# Patient Record
Sex: MALE | Race: WHITE
[De-identification: names, ages, dates, MRNs, and addresses within clinical notes are randomized per-mention and may not be internally consistent; named-entity substitution may affect disease eponyms.]

---

## 2017-02-13 NOTE — EMERGENCY ROOM VISIT NOTE
History


First contact with patient:  17:53


Chief Complaint:  CALF PAIN


Stated Complaint:  POPPING OF R CALF FOLLOWED BY IMMENSE PAIN





History of Present Illness


The patient is a 41 year old male who presents to the Emergency Room with 

complaints of right calf pain.  The patient states that he was running 

approximately one and half hours ago when he felt a sudden popping sensation 

and pain in his right calf.  He states it felt like someone shot him in the 

calf.  He has had difficulty walking and intense pain in the calf since then.  

He rates the discomfort at 8/10.  He denies any previous injuries.  The patient 

does report a history of right knee problems and has seen orthopedics regarding 

this.  He did receive an injection in the right knee 2 months ago.  The patient 

is concerned because he states he took 2 doses of ciprofloxacin for urinary 

symptoms 1 week ago.  He states that he read on the Internet that this may 

cause tendon rupture.  The patient denies any numbness or weakness.  He denies 

any other injuries.





Review of Systems


A complete 6 Review of Systems was discussed with the patient, with pertinent 

positives and negatives listed in the History of Present Illness. All remaining 

Review of Systems questions can be considered negative unless otherwise 

specified.





Social History


Smoking Status:  Never Smoker





Current/Historical Medications


Scheduled


Valacyclovir HCl (Valacyclovir HCl), 500 MG PO DAILY





Scheduled PRN


Oxycodone/Acetaminophen 5MG/325MG (Percocet 5MG/325MG), 1-2 TABS PO Q4H PRN for 

Pain





Allergies


Uncoded Allergies:  


     PENICILLIN (Allergy, Severe, ANAPHYLAXIS, 2/13/17)





Physical Exam


Vital Signs











  Date Time  Temp Pulse Resp B/P Pulse Ox O2 Delivery O2 Flow Rate FiO2


 


2/13/17 18:21  77 18 137/90 95 Room Air  


 


2/13/17 17:50 37.0 81 18 147/87 97 Room Air  











Physical Exam


VITALS: Vitals are noted on the nurse's note and reviewed by myself.  Vital 

signs stable.


GENERAL: This is a 41-year-old male, in no acute distress, nondiaphoretic, well-

developed well-nourished.


SKIN: No erythema, warmth or ecchymosis.


MUSCULOSKELETAL: There is no obvious deformity.  There is some edema and 

significant tenderness of the medial aspect of the proximal right gastrocnemius 

muscle.  Patient has full plantar flexion but decreased dorsiflexion.


NEURO: Patient was alert and oriented to person place and time.  Normal 

sensation to light and sharp touch.  Deep tendon reflexes 2+.





Medical Decision & Procedures


Medications Administered











 Medications


  (Trade)  Dose


 Ordered  Sig/Zach


 Route  Start Time


 Stop Time Status Last Admin


Dose Admin


 


 Oxycodone/


 Acetaminophen


  (Percocet


 5-325mg Tab)  1 tab  NOW  STAT


 PO  2/13/17 18:10


 2/13/17 18:11 DC 2/13/17 18:21


1 TAB











Medical Decision


Differential diagnosis includes tendon rupture, gastrocnemius tear, gastrectomy 

strain, among others.





The patient was evaluated as above.  Clinically, he has an injury to the medial 

aspect of the proximal right gastrocnemius muscle.  I do not feel any imaging 

is necessary at this time.  The patient is established with [facility] and was instructed to follow-up with them this week for further 

evaluation.  He was placed in an Ace wrap and given crutches.  He was given 1 

Percocet tablet here as well as a short prescription.  He was instructed to 

take anti-inflammatories and ice the area frequently.  He verbalized 

understanding of my assessment and treatment plan and was discharged home in 

good condition.





[place] Drug Monitoring Program


Search Results:  patient reviewed within database





Impression





 Primary Impression:  


 Injury of calf





Departure Information


Dispostion


Home / Self-Care





Condition


GOOD





Prescriptions





Oxycodone/Acetaminophen 5MG/325MG (PERCOCET 5MG/325MG)  Tab


1-2 TABS PO Q4H Y for Pain, #15 TAB


   For Initial Treatment


   Prov: Betsey Brady .ARMIN         2/13/17





Referrals


Zander Uribe M.D. (PCP)








George Starr M.D.





Patient Instructions


[facility]





Additional Instructions





You have been treated in the Emergency Department for a Calf Injury.  You have 

received pain medicine in the emergency department which impairs your ability 

to operate a vehicle. It is illegal for you to drive after receiving these 

medicines. 





You have been prescribed Percocet to be used for pain control. This is a 

narcotic medication. You cannot drive or consume alcohol while on this 

medicine. This medicine should only be used for pain that cannot be controlled 

with over-the-counter pain medicines.





For pain control, you can use the following over-the-counter medicines (if >11 yo):





- Regular strength (325mg/tab) Tylenol (acetaminophen) 2 tabs every 4-6 hours 

as needed. Do not exceed 12 tablets in a 24 hour period. Avoid taking more than 

4 grams (4000 mg) of Tylenol per day. This includes any other sources of 

acetaminophen you may take on a regular basis.





- Regular strength (200 mg/tab) Advil (ibuprofen) 1-2 tabs every 4-6 hours as 

needed. Do not exceed a dose of 3200 mg per day.





If this is a recent injury (<24 hrs), ice can be applied to the area of pain 

for the first 3 days to help decrease pain and inflammation.





You have been provided the number for an Orthopaedic Surgeon. You should call 

this number as soon as possible to establish a follow-up visit from today's 

Emergency Department visit.





Use the crutches to keep weight off of the affected leg until follow-up with 

orthopedics.





Return to the Emergency Department if your current symptoms worsen despite 

treatment course outlined above, or if you develop any of the following symptoms

: intractable pain despite aforementioned treatment course or new onset of 

numbness or tingling of the foot.

## 2017-02-14 NOTE — DIAGNOSTIC IMAGING REPORT
RIGHT TIBIA AND FIBULA 2 VIEWS



CLINICAL HISTORY: Calf pain. Recent injury.



FINDINGS: AP and lateral views of the right tibia and fibula are obtained. No

prior studies are available for comparison at the time of dictation. The

skeletal structures are well mineralized. No fracture is seen in the right tibia

or fibula. The knee and ankle joints are grossly maintained. Minimal soft tissue

edema is suggested in the calf.



IMPRESSION: Minimal soft tissue edema is suggested in the calf. No acute bony

abnormality is identified in the right tibia or fibula.







Electronically signed by:  Bill Rubio M.D.

2/14/2017 1:07 PM



Dictated Date/Time:  2/14/2017 1:06 PM

## 2017-10-03 NOTE — PAP/PSG TECHNICIAN REPORT
[facility]

Technician Polysomnogram Report



Study name:

None

Report date:

10/3/2017



Study date:

10/2/2017

Referring Physician:

 Zander Uribe M.D.



Name:

YARON JO 

Interpreting Physician:

Migue Hensley D.O.



YOB: 1975

Technician:

Ning Pacheco, PSGT.



Sex:

Male







Age:

42

StudyType:

PSG



Weight:

235 lbs









Height:

42 years, Height 6' 1"







BMI:

31







Medications:

Temazepam 30 mg.















Patient History





42 yr. old male presents to the sleep lab for a diagnostic sleep study. Pt. states that he has 
memory dysfunction, and impaired daytime functioning. States that he falls asleep easily, but wakes 
often until 3 am when he gets up for the day. Patient states that he does not nap during the day.Ess 
= 12. 







Parameters Monitored

NPSG: E1-M2, E2-M1, Fp1-M2, Fp2-M1, F3-M2, F4-M2, F4-M1, C3-M2, C4-M2, C4-M1, O1-M2, O2-M2,

O2-M1, T3-M2, T4-M1, P3-M2, P4-M1, CHIN1, CHIN2, HR, EKG, Legs, PFLOW, SNOR, FLOW, CFLOW,

Tidal Volume, THOR, ABDO, SpO2, PLTH, CPRESS, ETCO2 Wave, ETCO2, pH





Sleep Architecture



Sleep Stages



Time at Lights Off

9:38:45 PM



STAGES

Time (min.)

TST (%)



Time at Lights On

3:56:45 AM



Wake

97.0

--



Total Recording Time (TRT)

377.00 min.



N1

32.5

12



Total Sleep Period (TSP)

372.5 min.



N2

170.5

61



Total Sleep Time (TST)

280.0min.



N3

16.0

6



Awake Time

97.0 min.



REM

61.0

22



Wake after Sleep Onset

95.5 min.











Sleep Efficiency (SE)

74 %











Sleep Onset Latency (SOL)

2.5 min.











Number of Stage 1 Shifts

None











Awakenings

9











Stage Changes

36











Number of REM periods

3



REM

61.0

22



REM Latency

75.0 min.



NREM

219.0

78





Body Position Analysis





Supine

Right

Left

Side

Prone

Vertical



Total Sleep Time (min.)

212.8

87.9

9.0

96.88

0.0

0.0



Total Sleep Time (%)

65%

31%

3%

35

0%

N/A%



Total Sleep Time REM (min.)

35.8

25.2

0.0

None

0.0

0.0



Total Sleep Time NREM (min.)

147.3

62.7

9.0

None

0.0

0.0



Intermittent Wake (min.)

29.6

0.0

67.4

None

0.0

0.0



Total Sleep Period (%)

56%

None





Arousals







Myoclonus (PLM) *







Events

Count

Index



Events

Count

Index



Spontaneous

32

7



Events Awake (PLMW)

4

2.5



Respiratory

4

0.9



Events Asleep w/ Arousal (PLMA)

5

1.1



PLM

5

1



Events Asleep w/o Arousal (PLMS)

69

14.8



Snoring

3

1



Total Asleep

74

15.9



Total

44

9



Total

78

12







Respiratory Analysis *





CA

OA

MA

CH

H

RERA

Total



Count

0

1

0

0

31

0

32



Index

0.0

0.2

0.0

0

6.6

0

6.9



Mean Duration

0.0

12.3

0.0

0.00

28.6

0.0

28.1



Longest Duration

0.0

12.3

0.0

0.00

0.0

0.0

57.9







Respiratory Event Summary 







Total

Supine

~Supine

Right

Left

Prone

REM

NREM



Apneas

Count

1

1

0

N/A

0

1





Index

0.2

0

0

0.0

0.0

N/A

0

0



Hypopneas (4% Desat)

Count

31

31

0

N/A

15

16





Index

6.6

10.2

0

0.0

0.0

N/A

14.8

4.4



Apneas & All Hypopneas 

Count

32

32

0

N/A

15

17





Index

6.9

10

0

N/A

14.8

4.7



Respiratory Events 

(Apn+All Hyp+RERA)

Count

32

32

0

N/A

15

17





Index

6.9

10

0

0.0

0.0

N/A

14.8

4.7



Respiratory Related Arousal

Count

4

32

0

N/A

2

2





Index

0.9

1

0

N/A

2

1





Snoring Analysis





Supine

Right

Left

Prone

REM

NREM

Total



Snore duration

15.0 min



Snores count

592

39

2

N/A

119

514

633



Snore mean duration

1.4 Sec



Snores index

194

27

13

N/A

117.0

140.8

135.6



TST with snoring (%)

5.3%







Desaturation Event Summary:



Minimum %SpO2

Event Count

Mean/Min/Max Duration(sec.)

Desaturation Index

% Time In Bed



> 90

31

30.0 / 9.5 / 60.0

5.5

94.9



86 - 90

0

N/A

0.0

4.5



81 - 85

0

N/A

0.0

0.6



76 - 80

0

N/A

0.0

0.0



71 - 75

0

N/A

0.0

0.0



66 - 70

0

N/A

0.0

0.0



61 - 65

0

N/A

0.0

0.0



56 - 60

0

N/A

0.0

0.0



51 - 55

0

N/A

0.0

0.0



< 50

0

N/A

0.0

0.0









Total

REM

NREM

Awake



<50%

0.0 min.



51 - 60%

0.0 min.



61 - 70%

0.0 min.



71 - 80%

0.0 min.



81 - 90%

18.2 min.

5.2 min.

10.3 min.

2.7 min.



91 - 100%

340.6 min.

55.6 min.

195.9 min.

89.1 min.



Average

93

94



Minimum SpO2

71

85

82

71



Desaturation Event Index

4.9

15.7

4.1

0.0



# Desat. Events below 89%

5

4

1

N/A



Time(%) with Saturation below 89%

1.0

0.3



Time(min.) with Saturation below 89%

3.4

1.2

1.2

1.1









Time (mins)

REM (mins)

NREM (mins)

% of TST



SpO2 Below 90%

16

11

N5

1.8



SpO2 Below 88%

2

0

0

1





Heart Rate Analysis









Min (bpm)

Max (bpm)

Average (bpm)



Awake

52

127

63



NREM

51

87

62



REM

54

92

63



Overall

51

92

62













Supplemental O2 Values



Minimum O2 level: None



Value  

Start Time

End Time





Technician Comments







PSG Study

Mr. Jo slept in the right, left, and supine positions.  No cardiac arrhythmia or PLM's noted.  No 
bruxism noted.  Snoring was noted and scored as a 2 on a scale of 1 through 5. (0=no snoring, 
5=snoring loud enough to be heard through a closed door or down the [address])  awoke to use 
the restroom zero times during the night.  Mr. Jo stated, I did not sleep as well as I do when I 
am in my own bed. 

The final report will be interpreted and signed by a sleep physician. The completed physician report 
will then be placed in the patient medical record.



  





 



 



 



 



 



 



 



 

 



Therapy (cm H2O)

0



TIB (min.)

377.0



TST (min.)

280.0



Sleep Onset (min.)

2.5



REM Onset From Sleep (min.)

75.0



Sleep Efficiency %

74



Wakefulness (%)

26



Wakefulness (min.)

97.0



NREM 1 (%)

12



NREM 1 (min.)

32.5



NREM 2 (%)

61



NREM 2 (min.)

170.5



NREM 3 (%)

6



NREM 3 (min.)

16.0







REM (%)

22



REM (min.)

61.0



# Arousals

44



Arousal Index

9



# Snore

633



Snore Index

135.6



AHI

6.9



AHI Supine

10



AHI Non-Supine

0



NREM AHI

4.7



REM AHI

14.8



RDI

6.9



# Obstructive Apnea

1



# Central Apnea

0



# Mixed Apnea

0







# Hypopneas

31



RERAs

0



Total Respiratory Events

33



Time Below SpO2 89% (min.)

2.3



Mean NREM SpO2 (%)

93



Mean REM SpO2 (%)

93



Mean Sleep SpO2 (%)

93



Min NREM SpO2 (%)

82



Min REM SpO2 (%)

85



Position Supine (min.)

212.8



Position Non-supine (min.)

96.9



LM Index Sleep

15.9



LM Index NREM

18.6



LM Index REM

5.9







Mean Heart Rate (bpm)

62



Min Heart Rate (bpm)

51

## 2017-10-03 NOTE — SLEEP STUDY
Sleep Study Report


Date of Service:


10/2/2017


Sleep Study Report


Clinical data:


The patient is a 42-year-old male.  He has a chief complaint of disturbed 

nocturnal sleep and memory problems.  The patient has difficulty sleeping after 

3 a.m..  He had 2 prior sleep studies done.  The 1st 1 in 2011 in [place] 

reported mild sleep apnea.  The 2nd study done in [place]  did not show sleep 

apnea.  The patient completed the [place] Sleepiness Scale and had a score of 

13. His BMI is elevated at 31. The patient however is very physically fit in 

spite of the BMI.  This was an in-lab diagnostic polysomnography.





Sleep architecture:


The total sleep period was 372.5 minutes.  The total sleep time was 280.0 

minutes.  The sleep efficiency was moderately reduced to 74 percent.  The sleep 

latency was short at 2.5 minutes.  Wake after sleep onset was elevated to 95.5 

minutes.  The sleep latency was 75 minutes.


Sleep consisted of stage N1 12 percent, stage N2 61 percent, stage N3 6 percent

, stage REM 22 percent.





Arousal data:


The patient had a total of 44 arousals including 32 spontaneous arousals, 4 

respiratory arousals, 5 PLM arousals, and 3 snoring arousals.  The arousal 

index was 9.





PLM data:


The patient had a total of 74 periodic limb movements of sleep for a PLM index 

of 15.9.  There were 5 arousals associated with limb movements for a PLM 

arousal index of 1.1.





EKG:


The underlying cardiac rhythm was normal sinus.  The cardiac rates ranged from 

51 to 92 beats per minute.  The average heart rate was 62 beats per minute.  No 

arrhythmia was seen.





Respiratory data:


The patient had a total of 32 respiratory events including 1 obstructive apnea 

and 31 hypopneas.  Hypopneas were scored according to the 4 percent 

desaturation rule.  The longest apnea was 12.3 seconds.  The mean duration of 

the hypopneas was 28.6 seconds.  The apnea-hypopnea index was 6.9 events per 

hour.  This reflects mild sleep apnea.





Oximetry data:


The average saturation was 93 percent.  The minimum saturation recorded was 71 

percent.  This clearly was technical however.  He had only 2 minutes with 

saturations less than 88 percent.





Technician comments:


The patient slept on the right, left, and supine positions.  No cardiac 

arrhythmia noted.  No bruxism noted.  Snoring was noted and scored as a 2 on a 

scale of 1 through 5. The patient indicated he did not sleep as well as he does 

in his own bed.





Impressions:


1. Obstructive sleep apnea-mild 


2.  Periodic limb movement disorder





Comments:


This sleep on the night of the sleep study parallels with what  the patient's 

history was.  He falls asleep readily.  He sleeps for approximately an hour and 

then has an awakening.  Ultimately keeps waking up and then subsequently cannot 

sleep.  He had very little sleep after 2:40 a.m..  He did have mild obstructive 

sleep apnea.  It seems unlikely that this degree of sleep apnea would be 

accounting for his insomnia as well as his memory issues.  However improvement 

with treatment cannot be excluded.  Thus consideration is given to treatment 

with nasal CPAP therapy as a trial basis to see if his symptoms improved.  His 

oxygenation was not significantly abnormal.  He had a modest number of limb 

movements but with few arousals.  It seems unlikely the limb movements are a 

significant contributor to his symptoms.





Recommendations:


1. Consideration is given to a trial of nasal CPAP.  This could be done by a 

CPAP titration in the sleep lab or with treatment with auto CPAP.





2.  Weight loss is advised in light of the elevation of body mass index at 31.





3. Further suggestions may be made following the trial of nasal CPAP.





4. The patient should be advised the appropriate principles of sleep hygiene 

including having a regular sleep-wake schedule and attempting to increase his 

total sleep time.


Copies To 1:   Zander Uribe M.D.; Migue Hensley,

## 2018-02-16 NOTE — DIAGNOSTIC IMAGING REPORT
R LOWER EXT JOINT WITHOUT



CLINICAL HISTORY: 42 years-old Male presenting with RIGHT KNEE PAIN. 



TECHNIQUE: Multisequence, multiplanar MR imaging of the right knee was performed

without the use of intravenous contrast. IV contrast: None. 



COMPARISON: Radiographs from 10/27/2017.



FINDINGS:



Localizer images: Unavailable.



No bony edema. Bone island noted in the lateral femoral condyle.



Full-thickness articular cartilage loss at the medial most aspect of the medial

tibial plateau with subchondral cystic change. Osteophytosis also noted. Minimal

increased signal intensity and irregularity of the lateral facet of the patellar

cartilage. No irregularity of the apposing trochlear cartilage.



Complex tear of the posterior horn body junction of the medial meniscus, which

approaches the inferior articular surface. Significant attenuation of the body

of the medial meniscus, which is slightly extruded laterally. There is suspected

disruption of the meniscofemoral ligamentous attachment of the medial meniscus.

The anterior horn is largely preserved. Degenerative changes of the posterior

horn of the lateral meniscus suspected.



Anterior and posterior cruciate ligaments intact.



Medial collateral ligament intact.



Lateral collateral ligament complex including the biceps femoris tendon, fibular

collateral ligament, popliteus tendon, and iliotibial band intact.



Quadriceps and patellar tendons intact.



Small knee joint effusion. No popliteal cyst.



Normal muscle bulk and muscle signal intensity.



IMPRESSION:

1.  Complex tear of the posterior horn body junction with severe attenuation of

the medial meniscal body and suspected disruption of the medial meniscofemoral

ligament.



2.  Degenerative changes of the posterior horn of the lateral meniscus.



3.  Full thickness articular cartilage loss with subchondral cystic change and

osteophytosis at the medial aspect of the medial tibial plateau.



4.  Minimal degenerative changes of the lateral patellar facet articular

cartilage.



5.  Small knee joint effusion.







Electronically signed by:  George Clark M.D.

2/16/2018 8:41 AM



Dictated Date/Time:  2/16/2018 8:35 AM

## 2018-03-12 NOTE — OPERATIVE REPORT
DATE OF OPERATION:  03/12/2018

 

PREOPERATIVE DIAGNOSES:  Right knee medial meniscus tear and a subchondral

fracture of the medial tibial plateau.

 

POSTOPERATIVE DIAGNOSES:  Same and extensive synovitis.

 

OPERATIONS PERFORMED:

1.  Injection of calcium phosphate cement to treat subchondral proximal

tibial fracture.

2.  Right knee arthroscopy with synovectomy and partial medial meniscectomy.

 

SURGEON:  George Chavez MD

 

ASSISTANT:  DUNCAN Jones.

 

ESTIMATED BLOOD LOSS:  25 mL.

 

IV FLUIDS:  650 mL of crystalloid.

 

SPECIMENS:  None.

 

COMPLICATIONS:  Extrusion of a calcium phosphate cement into the soft tissues

just anterior to the semimembranosus.  This was removed through a separate

posteromedial incision.

 

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

at [facility], who has had medial sided knee pain that has been refractory to

conservative management including injections, activity modification and

others.  He would like to stay active running.  Physical examination shows

tenderness along the proximal medial tibia.  He has also medial joint line

tenderness.  MRI shows a subchondral fracture with subchondral cyst formation

in the proximal medial tibia.  He also has a degenerative medial meniscus

tear.  His arthritis is mild on his x-rays.  I had a long discussion with him

about the risks and benefits of surgery, alternatives to surgery and expected

outcomes.  He understands that due to early arthritis he has in his knee that

this may preclude him from a high level activities even despite our best

efforts with surgery.  After reviewing all these, he elected to proceed.  All

questions were answered.  Informed consent was signed.

 

OPERATIVE FINDINGS:

1.  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

2.  Trochlea showed grade 1 chondromalacia.

3.  Medial and lateral gutters were free of any loose bodies.

4.  The medial compartment showed a degenerative tear of the body of the

medial meniscus extending into the posterior horn.  He was already clearly a

meniscal deficient, with over 80% of the body of the meniscus removed from his 
prior 2 knee scopes.  There was an area of grade 4 chondromalacia involving the 
medial tibial plateau approximately 8 x 10 mm

in size with well-defined margins.  He had some evidence of prior steroid

injections as well with some precipitate of the steroid medication in the

cartilage of the medial femoral condyle and medial meniscus.

5.  The ACL and the PCL were intact.

6.  The anterior compartment showed both medial and lateral synovitis.

7.  The lateral compartment showed some fraying at the posterior root, but

the remainder of the lateral meniscus was intact.  He had grade 3 chondral

wear of the lateral tibial plateau and grade 1 softening of the lateral

femoral condyle diffusely.

 

The calcium phosphate cement was injected into the subchondral fracture. Cement 
that extruded in front of the semimembranosus was removed through a 
posteromedial incision.

A partial medial meniscectomy was performed as well as a synovectomy of the 
medial and lateral

knee.

 

DESCRIPTION OF THE OPERATION:  The patient was identified in the preoperative

holding area, where surgical site was marked.  He was brought back to main

operating room, where he was placed on the operating room table and general

anesthesia was administered.  A bump was placed underneath the

operative hip.  All bony prominences were padded.  Perioperative antibiotics

were administered.  He was prepped and draped in the normal sterile fashion. 

Prior to incision, a multidisciplinary timeout was called.  All in the room

were in agreement.

 

We began by performing the calcium phosphate injection procedure to treat his

subchondral fracture.  C-arm was brought in and the injection site was

localized on the AP view initially.  We then made a small stab incision and

placed the trocar sleeve down on the bone until it was optimized in the AP

view.  We then swung the C-arm to the lateral view.  We obtained a perfect

lateral view of the knee.  We then drilled the needle under fluoroscopic

guidance towards the posterior cortex.  We then removed the inner trocar. 

The side vents were directed medially.  We then injected the calcium

phosphate cement in both the medial direction as well as superiorly. 

Unfortunately, some of the cement extruded out the posterior aspect of the

knee, indicating a small perforation in the posterior cortex.  We therefore

pulled the needle back approximately a cm and injected the remainder of the

cement in this location that would be further from the posterior knee.  Our

fluoroscopic images were then reviewed showing a moderate amount of cement 
anterior to the semimembranosus.  In order to decrease the irritation

of the cement on the semimembranosus, I therefore elected to remove the

cemented that extruded outside of the bone into the soft tissues.  A 4-cm

incision was made centered over the posteromedial aspect of the tibia.  I

dissected through subcutaneous tissues down to the level of the fascia. 

The saphenous nerve was not visible in the wound.  Fascia was incised in line 
with the incision.  The semimembranosus tendon was identified.  We were

able to palpate the cement just on the anterior aspect of the semimembranosus

tendon.  Cement was then removed with a combination of curettes and rongeurs.

 We irrigated out the wound with saline.  Fluoroscopy was brought back in and 
we had removed

the vast majority of the cement.  The small amount that remained will resorb

over time and further dissection was deemed to be not beneficial for the

patient.  Therefore, we irrigated out his wound.  The fascia was closed with

a running 0 Vicryl suture.  The deep dermis was closed with interrupted 2-0

Vicryl and a 3-0 Monocryl was run subcuticularly for the skin.

 

Next, we moved on to his arthroscopic procedure.  An anterolateral working

portal was created followed by a medial working portal under direct

visualization.  Diagnostic arthroscopy was performed, revealing the above

findings.

 

Once a diagnostic arthroscopy was complete, the electrocautery as well as the

shaver were used to remove pathologic synovitis in the medial and lateral

aspects of the knee.  His medial meniscus tear was then extensively probed and 
was not repairable. 

The shaver was then used to trim the medial meniscus back to a stable margin.

 A biter was also used along the posterior horn, where he had some

degenerative fraying.  The knee was then irrigated with saline.  The

arthroscopic instruments were removed.  The patient's portals were closed

with inverted 3-0 Monocryl sutures.  A compressive dressing was applied.  The

patient was awoke from anesthesia and transferred to recovery room in stable

condition.

 

POSTOPERATIVE COURSE:  The patient will be discharged home from the recovery

room.  He will be weightbearing as tolerated with crutches for the next 2-3

days.  He will be on aspirin for DVT prophylaxis.  He will return to our

clinic tomorrow for physical therapy to begin his rehabilitation following

the knee arthroscopy debridement protocol.

 

 

I attest to the content of the Intraoperative Record and any orders documented 
therein. Any exceptions are noted below.

 

 

 

NIGEL

## 2018-03-12 NOTE — [facility] POST OPERATIVE BRIEF NOTE
Immediate Operative Summary


Operative Date


Mar 12, 2018.





Pre-Operative Diagnosis





Right Knee Medial Meniscus Tear, Proximal Tibia Subchondral Fracture





Post-Operative Diagnosis





same as preop





Procedure(s) Performed





Right Knee Arthroscopic Partial Medial Meniscectomy, injection of Calcium 


Phospate Cement To Treat Right Proximal Tibia Fracture





Surgeon


Dr. Kraus





Assistant Surgeon(s)


ARMIN Parisi





Estimated Blood Loss


25ML





Findings


Consistent with Post-Op Diagnosis





Fluids (cc crystalloids)


650 cc





Specimens





none





Drains


None





Anesthesia Type


General





Complication(s)





Extrusion of cement into soft tissues - removed through posteromedial


approach to the knee





Disposition


Accompanied Pt To Recovery:  no


Disposition:  Recovery Room / PACU

## 2018-03-12 NOTE — DISCHARGE INSTRUCTIONS
Discharge Instructions


Date of Service


Mar 12, 2018.





Admission


Reason for Admission:  Right Knee Medial Meniscus Tear, Proximal Tibia Fx





Discharge


Discharge Diagnosis / Problem:  Right knee medial meniscus tear, Prox tibia fx





Discharge Goals


Goal(s):  Decrease discomfort, Improve function, Increase independence





Activity Recommendations


Activity Limitations:  as noted below


Lifting Limitations:  none


Exercise/Sports Limitations:  until after follow-up appointment


May Resume Sexual Activity:  when tolerated


Shower/Bathe:  tomorrow, keep incision dry


Driving or Machine Use:  No driving until cleared by Orthopedic specialist


Weightbearing Status:  Right weightbearing (as tolerated with crutch aide)





.





Instructions / Follow-Up


Instructions / Follow-Up


 Post-operative Instructions





Dear Patient and Family/Friends,





Before you are discharged from the hospital, it is important to know what to 

expect when you get home after surgery.  To that end, we have created this 

sheet of discharge instructions which covers many commonly asked questions.  

Make sure you go through this sheet in its entirety with your nurse before you 

are discharged.  Please note that we will go over the specifics of your surgery 

and recovery when you return for your first post-operative visit.





Sincerely,


Dr. Chavez





Pain


Expect to be in a fair amount of pain after surgery.  Remember, our goal is not 

to eliminate your pain, but to make it tolerable. It is a good idea to stay 

ahead of your pain by taking the medications you were prescribed once you get 

home.  Typically, the pain starts improving 3-7 days after surgery. You should 

start weaning off the narcotic pain medication (oxycodone, hydrocodone, 

hydromorphone, morphine) as soon as your pain improves. Please call our office 

if your pain is not adequately controlled.





Ice


Ice your operative site at least 5 times a day for 15-30 minutes at a time. 

Make sure you have a thin cloth between the ice or cooling unit and your skin 

to prevent frost bite.  This is especially important if you received a nerve 

block.  Continue icing your operative site for the first 5-7 days after surgery

, then as needed.





Diet/Nausea/Vomiting


Start by drinking clear liquids and eating crackers.  If you can tolerate this, 

then you may resume your normal diet. If you feel nauseated or vomit, take 

Zofran/ondansetron (if prescribed). Please call our office if you have 

intractable nausea or vomiting, or, if after hours, you may go to the Emergency 

Room for help.





Constipation


Constipation is a common side effect of narcotic pain medication.  If you have 

not had a bowel movement within 2 days after surgery, we recommend purchasing 

an over the counter laxative such as Milk of Magnesia, Dulcolax, or Miralax 

from a local pharmacy, and taking it as instructed.  Call our clinic if any 

questions.





Slings and Braces


If you were placed in a sling or brace, it must be worn at all times, including 

sleep.  You may remove your sling or brace for physical therapy, home exercises

, and showering.  The length of time you will be in your brace and range of 

motion restrictions depends on what surgery you had; these details will be 

reviewed at your first post-operative appointment.








Weight bearing and Range of Motion.


Do not bear any weight through your operative extremity immediately after 

surgery.  If you had upper extremity surgery, do not lift anything with that 

arm.  If you are in a knee brace, keep it locked in place until your follow-up. 

We will discuss your weight bearing, range of motion, and lifting restrictions 

in detail at your first post-operative appointment.


Continuous Passive Motion (CPM) Machine


If you were prescribed a CPM machine, it will start after your first post-

operative appointment, at which time we will give you instructions on the range 

of motion settings and duration of treatment





Physical therapy


You will be given a prescription for physical therapy or occupational therapy 

at your first post-operative appointment.  Typically, patients start therapy 

within 1 week of surgery





Wound care and showering


We will inspect your wound at your first post-operative visit, and may do a 

dressing change at that time. Most patients will be in a water-proof dressing 

that is removed 14 days after surgery. It is normal to see some dried blood on 

the dressing.  Do not remove your dressing, paper strips or sutures yourself 

unless you are given permission.


Showering is allowed the day after surgery.  Do not scrub or remove any 

dressings.  The wound should not be submerged underwater (i.e. in a bathtub or 

pool) until 4 weeks after surgery





MIKE stockings


If you were given white stockings, these are to be worn at all times except to 

shower (on both legs) for the first 2 weeks after surgery.





Driving


You may not drive while taking narcotic pain medication or while in a cast, 

splint, sling or brace.  


You, the patient, need to make the final determination about when you are safe 

to drive, however, the earliest you may consider driving after surgery is below:


Hand/Wrist/Elbow Surgery: 3 days         


Shoulder Surgery:             2 weeks


Hip,/Knee/Ankle Surgery:   4 weeks      


Fracture repair:                6 weeks





Return to Work


Your return to work depends on what surgery was done and what type of work you 

do.  Please bring any paperwork your employer needs completed to your first post

-operative visit.  Also, bring a description of your job duties, as this helps 

us to understand what risks you may face at work.





Travel


Avoid long distance travel (greater than 1 hour) in airplanes and cars for the 

first 6 weeks after surgery.  If you must travel, you need to have a Doppler 

ultrasound done before you travel to rule out a blood clot in your legs. 





Follow-up


You should have a follow-up appointment already scheduled 1-2 days after 

surgery.  If not, please contact our office to make this appointment before you 

leave the hospital.





When to call the office


It is normal to have swelling and bruising in the limb that was operated on.  

This will improve with time.  It is also normal to have fevers for the first 2 

days after surgery.  Reasons you should call your doctor include: Uncontrolled 

pain; Nausea, vomiting, or constipation that does not improve with medication; 

Fevers over 101.5, chills, sweats; Drainage or bleeding from the wound; Foul 

odor; Spreading areas of redness; Any other concerns





Current Hospital Diet


Patient's current hospital diet:





Discharge Diet


Recommended Diet:  Regular Diet





Procedures


Procedures Performed:  


Right Knee Arthroscopic Partial Medial Meniscectomy, injection of Calcium 


Phospate Cement To Treat Right Proximal Tibia Fracture





Pending Studies


Studies pending at discharge:  no





Medical Emergencies








.


Who to Call and When:





Medical Emergencies:  If at any time you feel your situation is an emergency, 

please call 911 immediately.





.





Non-Emergent Contact


Non-Emergency issues call your:  Primary Care Provider


Call Non-Emergent contact if:  you have a fever, temperature is above 101.5, 

your pain is not controlled, your pain is worsening, wound has increased 

drainage, you have any medication questions


.








"Provider Documentation" section prepared by Renan Jones.








.





[place] Drug Monitoring Program


Search Results:  patient reviewed within database, no issues identified, see 

additional documentation

## 2018-03-12 NOTE — [facility] OPERATIVE REPORT
Operative Report


Operative Date


Mar 12, 2018.





Pre-Operative Diagnosis





Right Knee Medial Meniscus Tear, Proximal Tibia Subchondral Fracture





Post-Operative Diagnosis





same as preop





Procedure(s) Performed





Right Knee Arthroscopic Partial Medial Meniscectomy, injection of Calcium 


Phospate Cement To Treat Right Proximal Tibia Fracture





Surgeon


Dr. Kraus





Assistant Surgeon(s)


ARMIN Parisi





Estimated Blood Loss


25ML





Fluids


650 cc





Specimens





none





Drains


None





Anesthesia Type


General





Complication(s)





Extrusion of cement into soft tissues - removed through posteromedial


approach to the knee





Disposition


no


Recovery Room / PACU


I attest to the content of the Intraoperative Record and any orders documented 

therein.  Any exceptions are noted below.

## 2018-03-12 NOTE — ANESTHESIA PROGRESS NT - [facility]
Anesthesia Post Op Note


Date & Time


Mar 12, 2018 at 12:49





Vital Signs


Pain Intensity:  4





Vital Signs Past 12 Hours








  Date Time  Temp Pulse Resp B/P (MAP) Pulse Ox O2 Delivery O2 Flow Rate FiO2


 


3/12/18 12:22  85 11     


 


3/12/18 12:22  85 11  98   


 


3/12/18 12:21    144/81    


 


3/12/18 12:19  87 15     


 


3/12/18 12:19  88 15  94   


 


3/12/18 12:16    139/79    


 


3/12/18 12:15 36.8 82 12 139/79 97 Room Air  


 


3/12/18 12:14  87 12     


 


3/12/18 12:14  88 12  96   


 


3/12/18 12:13  86 19     


 


3/12/18 12:13  84 19  96   


 


3/12/18 12:11    144/95    


 


3/12/18 12:08  81 8     


 


3/12/18 12:08  80 8  96   


 


3/12/18 12:06    129/89    


 


3/12/18 12:03  80 14  99   


 


3/12/18 12:03  81 14     


 


3/12/18 12:01    140/92    


 


3/12/18 11:58  85 22     


 


3/12/18 11:58  85 22  96   


 


3/12/18 11:56    133/87    


 


3/12/18 11:53  87 15  99   


 


3/12/18 11:53  87 15     


 


3/12/18 11:51    145/93    


 


3/12/18 11:48  75 9  98   


 


3/12/18 11:48  76 9     


 


3/12/18 11:46    126/84    


 


3/12/18 11:44    130/88    


 


3/12/18 11:43 36.1 87 12 130/88 97 Mask 6 


 


3/12/18 08:11 36.7 66 18 137/82 (100) 99 Room Air  











Notes


Mental Status:  alert / awake / arousable, participated in evaluation


Pt Amnestic to Procedure:  Yes


Nausea / Vomiting:  adequately controlled


Pain:  adequately controlled


Airway Patency, RR, SpO2:  stable & adequate


BP & HR:  stable & adequate


Hydration State:  stable & adequate


Anesthetic Complications:  no major complications apparent

## 2018-03-29 NOTE — EDITING REQUIRED CODING QUERY
MENISCUS TEAR





To promote full compliance with coding requirements relating to patient care physician 
participation is requested in all cases of  uncertainty.  Please assist us with the 
question(s) below:



Please specify the type of Meniscus Tear by placing an "X" within the parenthesis (). If 
other please document type.



       (x ) Current Injury

       ( ) Old Injury

       ( ) Other: (Please Specify)

     



Thank you  

Dinora Helms

## 2018-05-04 ENCOUNTER — HOSPITAL ENCOUNTER (OUTPATIENT)
Dept: HOSPITAL 45 - C.RDSM | Age: 43
Discharge: HOME | End: 2018-05-04
Attending: ORTHOPAEDIC SURGERY
Payer: OTHER GOVERNMENT

## 2018-05-04 DIAGNOSIS — Z98.890: Primary | ICD-10-CM

## 2019-05-01 ENCOUNTER — HOSPITAL ENCOUNTER (OUTPATIENT)
Dept: URGENT CARE | Facility: CLINIC | Age: 44
Discharge: HOME | End: 2019-05-01
Attending: PHYSICIAN ASSISTANT